# Patient Record
(demographics unavailable — no encounter records)

---

## 2024-10-18 NOTE — ASSESSMENT
[FreeTextEntry1] : # CAD s/p APOLLO-mLAD 6/11/21 (mRCA 50%) - stable - still smoking (5-6 cigarettes/day), ed done - cont ASA, plavix, atorva - ed done re heart healthy lifestyle modifications and diet - 5/2022 TTE wnl, Grade I diastolic dysfunction - 8/2024 TTE c/w nl LV size and sys fxn, EF 64%, basal inferior and basal inferoseptal segments abnormal, Asc Ao 3.4 cm - 9/2024 CCTA c/w mod stenosis in pLAD, patent mLAD stent - check TTE in 6 months   # HTN - BP today 126/86 - improving - cont losartan 100 mg qd - cont amlodipine to 10 mg qd - hctz d/cd 2/2 hyponatremia - 8/2024 TTE c/w nl LV size and sys fxn, EF 64%, basal inferior and basal inferoseptal segments abnormal, Asc Ao 3.4 cm - labs reviewed in detail - check TTE in 6 months  # Frequent PACs - Holter done 10/2021 c/w ~20% PACSs - results discussed in detail with pt - pt states he is not sx'tic, EF preserved on TTE - 3/10/23 Event Monitor c/w ~11% PACs, reviewed with pt  RTC 3 months

## 2024-10-18 NOTE — HISTORY OF PRESENT ILLNESS
[FreeTextEntry1] : 63M w/ HTN, former heavy smoker, epilepsy, admitted to [facility] for syncope resulting in fall with nasal/mandible fracture, noted to have elevated cardiac enzymes, s/p cath 6/11/21 with APOLLO to 70% mLAD. ECHO mild lvh, no valvular disease. No arrythmias identified. Seen by epilepsy team, depakote increased due to highly epileptical focus in right frontal region on EEG. D/c on 6/13/21 on asa and plavix.   10/18/24 OV: pt returns today for f/u and results of CCTA. Overall feeling well since last seen, still smoking cigarettes. 9/6/24 OV: pt returns today for f/u. Overall feeling well since last OV. Denies any c/o chest pain, palpitations or PURDY. Compliant w/ all medications. No c/o abnormal bleeding.  6/28/24 OV: pt returns today for f/u. States he was recently in the hospital for a foreign body in his eye. Otherwise feeling well, no new cardiac complaints.  11/17/23 OV: pt returns today for f/u, states he was recently at the [facility] ED due to a low platelet count. He is now going to f/u with a hematologist. Otherwise feeling well overall. Still smoking cigarettes, ~10/day. 7/7/23 OV: pt returns today for 3 month f/u. No new complaints. 4/7/23 OV: pt returns today to discuss results of Event Monitor. No new complaints. 3/10/23 OV: pt returns today BP check, reports that he has been taking the losartan-hctz 2/17/23 OV: pt returns today for BP check, BP still high. Reports that he did not  the losartan-hctz from his pharmacy. 1/20/23 OV: no new complaints, BP elevated - has not been taking losartan-HCTZ. Still smoking 5-6 cigs/day.  10/14/22 OV: 3 month f/u, no new complaints.  7/15/22 OV: TTE and lab results. States he is preop for a colonoscopy. ET unchanged, unlimited. States he has stopped smoking since last seen.  4/29/22 OV: still smoking 12/15 cigs/day. Otherwise states ET remains unlimited.  1/28/22 OV: no interval change, states ET unlimited, no chest pain or PURDY. Still smoking. 10/5/21 OV: no further eps of dizziness/fainting. 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 smoking again but not as many, 2-3 cigs/day.  7/6/21 OV: since d/c doing well, no further syncopal episodes. No cp or sob. walking a lot, today walked from [facility] street to this office! No bleeding, compliant with asa/plavix. stopped smoking (1ppd) after this recent hospitalization.   9/6/24 ECG: NSR at 62 bpm, nl axis, RBBB, occasional PVCs 11/17/23 ECG: NSR at 67 bpm, nl axis, RBBB, occasional PACs 7/7/23 ECG: NSR at 65 bpm, nl axis, RBBB, occasional PACs 3/10/23 ECG: NSR at 63 bpm, nl axis, RBBB, freq PACs 1/20/23 ECG: NSR at 62 bpm, nl axis, RBBB, freq PACs 10/14/22 ECG: NSR at 66 bpm, nl axis, RBBB, freq PACs  7/15/22 ECG: NSR at 60 bpm, nl axis RBBB 4/29/22 ECG: sinus bradycardia at 43bpm, nl axis, RBBB  1/28/22 ECG: NSR at 63 bpm, nl axis, RBBB, freq PACs

## 2024-10-18 NOTE — HISTORY OF PRESENT ILLNESS
[FreeTextEntry1] : 63M w/ HTN, former heavy smoker, epilepsy, admitted to [facility] for syncope resulting in fall with nasal/mandible fracture, noted to have elevated cardiac enzymes, s/p cath 6/11/21 with APOLLO to 70% mLAD. ECHO mild lvh, no valvular disease. No arrythmias identified. Seen by epilepsy team, depakote increased due to highly epileptical focus in right frontal region on EEG. D/c on 6/13/21 on asa and plavix.   10/18/24 OV: pt returns today for f/u and results of CCTA. Overall feeling well since last seen, still smoking cigarettes. 9/6/24 OV: pt returns today for f/u. Overall feeling well since last OV. Denies any c/o chest pain, palpitations or PURDY. Compliant w/ all medications. No c/o abnormal bleeding.  6/28/24 OV: pt returns today for f/u. States he was recently in the hospital for a foreign body in his eye. Otherwise feeling well, no new cardiac complaints.  11/17/23 OV: pt returns today for f/u, states he was recently at the [facility] ED due to a low platelet count. He is now going to f/u with a hematologist. Otherwise feeling well overall. Still smoking cigarettes, ~10/day. 7/7/23 OV: pt returns today for 3 month f/u. No new complaints. 4/7/23 OV: pt returns today to discuss results of Event Monitor. No new complaints. 3/10/23 OV: pt returns today BP check, reports that he has been taking the losartan-hctz 2/17/23 OV: pt returns today for BP check, BP still high. Reports that he did not  the losartan-hctz from his pharmacy. 1/20/23 OV: no new complaints, BP elevated - has not been taking losartan-HCTZ. Still smoking 5-6 cigs/day.  10/14/22 OV: 3 month f/u, no new complaints.  7/15/22 OV: TTE and lab results. States he is preop for a colonoscopy. ET unchanged, unlimited. States he has stopped smoking since last seen.  4/29/22 OV: still smoking 12/15 cigs/day. Otherwise states ET remains unlimited.  1/28/22 OV: no interval change, states ET unlimited, no chest pain or PURDY. Still smoking. 10/5/21 OV: no further eps of dizziness/fainting. 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 smoking again but not as many, 2-3 cigs/day.  7/6/21 OV: since d/c doing well, no further syncopal episodes. No cp or sob. walking a lot, today walked from [facility] street to this office! No bleeding, compliant with asa/plavix. stopped smoking (1ppd) after this recent hospitalization.   9/6/24 ECG: NSR at 62 bpm, nl axis, RBBB, occasional PVCs 11/17/23 ECG: NSR at 67 bpm, nl axis, RBBB, occasional PACs 7/7/23 ECG: NSR at 65 bpm, nl axis, RBBB, occasional PACs 3/10/23 ECG: NSR at 63 bpm, nl axis, RBBB, freq PACs 1/20/23 ECG: NSR at 62 bpm, nl axis, RBBB, freq PACs 10/14/22 ECG: NSR at 66 bpm, nl axis, RBBB, freq PACs  7/15/22 ECG: NSR at 60 bpm, nl axis RBBB 4/29/22 ECG: sinus bradycardia at 43bpm, nl axis, RBBB  1/28/22 ECG: NSR at 63 bpm, nl axis, RBBB, freq PACs

## 2024-10-18 NOTE — HISTORY OF PRESENT ILLNESS
[FreeTextEntry1] : 63M w/ HTN, former heavy smoker, epilepsy, admitted to [facility] for syncope resulting in fall with nasal/mandible fracture, noted to have elevated cardiac enzymes, s/p cath 6/11/21 with APOLLO to 70% mLAD. ECHO mild lvh, no valvular disease. No arrythmias identified. Seen by epilepsy team, depakote increased due to highly epileptical focus in right frontal region on EEG. D/c on 6/13/21 on asa and plavix.   10/18/24 OV: pt returns today for f/u and results of CCTA. Overall feeling well since last seen, still smoking cigarettes. 9/6/24 OV: pt returns today for f/u. Overall feeling well since last OV. Denies any c/o chest pain, palpitations or PURDY. Compliant w/ all medications. No c/o abnormal bleeding.  6/28/24 OV: pt returns today for f/u. States he was recently in the hospital for a foreign body in his eye. Otherwise feeling well, no new cardiac complaints.  11/17/23 OV: pt returns today for f/u, states he was recently at the [facility] ED due to a low platelet count. He is now going to f/u with a hematologist. Otherwise feeling well overall. Still smoking cigarettes, ~10/day. 7/7/23 OV: pt returns today for 3 month f/u. No new complaints. 4/7/23 OV: pt returns today to discuss results of Event Monitor. No new complaints. 3/10/23 OV: pt returns today BP check, reports that he has been taking the losartan-hctz 2/17/23 OV: pt returns today for BP check, BP still high. Reports that he did not  the losartan-hctz from his pharmacy. 1/20/23 OV: no new complaints, BP elevated - has not been taking losartan-HCTZ. Still smoking 5-6 cigs/day.  10/14/22 OV: 3 month f/u, no new complaints.  7/15/22 OV: TTE and lab results. States he is preop for a colonoscopy. ET unchanged, unlimited. States he has stopped smoking since last seen.  4/29/22 OV: still smoking 12/15 cigs/day. Otherwise states ET remains unlimited.  1/28/22 OV: no interval change, states ET unlimited, no chest pain or PURDY. Still smoking. 10/5/21 OV: no further eps of dizziness/fainting. 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 smoking again but not as many, 2-3 cigs/day.  7/6/21 OV: since d/c doing well, no further syncopal episodes. No cp or sob. walking a lot, today walked from [facility] street to this office! No bleeding, compliant with asa/plavix. stopped smoking (1ppd) after this recent hospitalization.   9/6/24 ECG: NSR at 62 bpm, nl axis, RBBB, occasional PVCs 11/17/23 ECG: NSR at 67 bpm, nl axis, RBBB, occasional PACs 7/7/23 ECG: NSR at 65 bpm, nl axis, RBBB, occasional PACs 3/10/23 ECG: NSR at 63 bpm, nl axis, RBBB, freq PACs 1/20/23 ECG: NSR at 62 bpm, nl axis, RBBB, freq PACs 10/14/22 ECG: NSR at 66 bpm, nl axis, RBBB, freq PACs  7/15/22 ECG: NSR at 60 bpm, nl axis RBBB 4/29/22 ECG: sinus bradycardia at 43bpm, nl axis, RBBB  1/28/22 ECG: NSR at 63 bpm, nl axis, RBBB, freq PACs

## 2025-01-10 NOTE — HISTORY OF PRESENT ILLNESS
[FreeTextEntry1] : 64M current smoker w/ CAD (s/p APOLLO to mLAD - 6/2021), HTN, HLD, PACs, and epilepsy who presents for cardiac evaluation.  1/10/25 OV: pt returns today for f/u. Overall feeling well, denies any c/o chest pain or shortness of breath. No new complaints. Still smoking 5-6 cigarettes/day.  10/18/24 OV: pt returns today for f/u and results of CCTA. Overall feeling well since last seen, still smoking cigarettes. 9/6/24 OV: pt returns today for f/u. Overall feeling well since last OV. Denies any c/o chest pain, palpitations or PURDY. Compliant w/ all medications. No c/o abnormal bleeding.  6/28/24 OV: pt returns today for f/u. States he was recently in the hospital for a foreign body in his eye. Otherwise feeling well, no new cardiac complaints.  11/17/23 OV: pt returns today for f/u, states he was recently at the [facility] ED due to a low platelet count. He is now going to f/u with a hematologist. Otherwise feeling well overall. Still smoking cigarettes, ~10/day. 7/7/23 OV: pt returns today for 3 month f/u. No new complaints. 4/7/23 OV: pt returns today to discuss results of Event Monitor. No new complaints. 3/10/23 OV: pt returns today BP check, reports that he has been taking the losartan-hctz 2/17/23 OV: pt returns today for BP check, BP still high. Reports that he did not  the losartan-hctz from his pharmacy. 1/20/23 OV: no new complaints, BP elevated - has not been taking losartan-HCTZ. Still smoking 5-6 cigs/day.  10/14/22 OV: 3 month f/u, no new complaints.  7/15/22 OV: TTE and lab results. States he is preop for a colonoscopy. ET unchanged, unlimited. States he has stopped smoking since last seen.  4/29/22 OV: still smoking 12/15 cigs/day. Otherwise states ET remains unlimited.  1/28/22 OV: no interval change, states ET unlimited, no chest pain or PURDY. Still smoking. 10/5/21 OV: no further eps of dizziness/fainting. 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 smoking again but not as many, 2-3 cigs/day.  7/6/21 OV: since d/c doing well, no further syncopal episodes. No cp or sob. walking a lot, today walked from [address] to this office! No bleeding, compliant with asa/plavix. stopped smoking (1ppd) after this recent hospitalization.   1/10/25 ECG: NSR at 63 bpm, nl axis, RBBB, frequent PACs 9/6/24 ECG: NSR at 62 bpm, nl axis, RBBB, occasional PVCs 11/17/23 ECG: NSR at 67 bpm, nl axis, RBBB, occasional PACs 7/7/23 ECG: NSR at 65 bpm, nl axis, RBBB, occasional PACs 3/10/23 ECG: NSR at 63 bpm, nl axis, RBBB, freq PACs 1/20/23 ECG: NSR at 62 bpm, nl axis, RBBB, freq PACs 10/14/22 ECG: NSR at 66 bpm, nl axis, RBBB, freq PACs  7/15/22 ECG: NSR at 60 bpm, nl axis RBBB 4/29/22 ECG: sinus bradycardia at 43bpm, nl axis, RBBB  1/28/22 ECG: NSR at 63 bpm, nl axis, RBBB, freq PACs

## 2025-01-10 NOTE — HISTORY OF PRESENT ILLNESS
[FreeTextEntry1] : 64M current smoker w/ CAD (s/p APOLLO to mLAD - 6/2021), HTN, HLD, PACs, and epilepsy who presents for cardiac evaluation.  1/10/25 OV: pt returns today for f/u. Overall feeling well, denies any c/o chest pain or shortness of breath. No new complaints. Still smoking 5-6 cigarettes/day.  10/18/24 OV: pt returns today for f/u and results of CCTA. Overall feeling well since last seen, still smoking cigarettes. 9/6/24 OV: pt returns today for f/u. Overall feeling well since last OV. Denies any c/o chest pain, palpitations or PURDY. Compliant w/ all medications. No c/o abnormal bleeding.  6/28/24 OV: pt returns today for f/u. States he was recently in the hospital for a foreign body in his eye. Otherwise feeling well, no new cardiac complaints.  11/17/23 OV: pt returns today for f/u, states he was recently at the [facility] ED due to a low platelet count. He is now going to f/u with a hematologist. Otherwise feeling well overall. Still smoking cigarettes, ~10/day. 7/7/23 OV: pt returns today for 3 month f/u. No new complaints. 4/7/23 OV: pt returns today to discuss results of Event Monitor. No new complaints. 3/10/23 OV: pt returns today BP check, reports that he has been taking the losartan-hctz 2/17/23 OV: pt returns today for BP check, BP still high. Reports that he did not  the losartan-hctz from his pharmacy. 1/20/23 OV: no new complaints, BP elevated - has not been taking losartan-HCTZ. Still smoking 5-6 cigs/day.  10/14/22 OV: 3 month f/u, no new complaints.  7/15/22 OV: TTE and lab results. States he is preop for a colonoscopy. ET unchanged, unlimited. States he has stopped smoking since last seen.  4/29/22 OV: still smoking 12/15 cigs/day. Otherwise states ET remains unlimited.  1/28/22 OV: no interval change, states ET unlimited, no chest pain or PURDY. Still smoking. 10/5/21 OV: no further eps of dizziness/fainting. 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 smoking again but not as many, 2-3 cigs/day.  7/6/21 OV: since d/c doing well, no further syncopal episodes. No cp or sob. walking a lot, today walked from [address] to this office! No bleeding, compliant with asa/plavix. stopped smoking (1ppd) after this recent hospitalization.   1/10/25 ECG: NSR at 63 bpm, nl axis, RBBB, frequent PACs 9/6/24 ECG: NSR at 62 bpm, nl axis, RBBB, occasional PVCs 11/17/23 ECG: NSR at 67 bpm, nl axis, RBBB, occasional PACs 7/7/23 ECG: NSR at 65 bpm, nl axis, RBBB, occasional PACs 3/10/23 ECG: NSR at 63 bpm, nl axis, RBBB, freq PACs 1/20/23 ECG: NSR at 62 bpm, nl axis, RBBB, freq PACs 10/14/22 ECG: NSR at 66 bpm, nl axis, RBBB, freq PACs  7/15/22 ECG: NSR at 60 bpm, nl axis RBBB 4/29/22 ECG: sinus bradycardia at 43bpm, nl axis, RBBB  1/28/22 ECG: NSR at 63 bpm, nl axis, RBBB, freq PACs

## 2025-01-10 NOTE — PHYSICAL EXAM
[Well Developed] : well developed [Well Nourished] : well nourished [No Acute Distress] : no acute distress [Normal Conjunctiva] : normal conjunctiva [Normal Venous Pressure] : normal venous pressure [No Carotid Bruit] : no carotid bruit [Normal S1, S2] : normal S1, S2 [No Murmur] : no murmur [No Rub] : no rub [No Gallop] : no gallop [Good Air Entry] : good air entry [No Respiratory Distress] : no respiratory distress  [Soft] : abdomen soft [Non Tender] : non-tender [No Masses/organomegaly] : no masses/organomegaly [Normal Bowel Sounds] : normal bowel sounds [Normal Gait] : normal gait [No Edema] : no edema [No Cyanosis] : no cyanosis [No Clubbing] : no clubbing [No Varicosities] : no varicosities [No Rash] : no rash [No Skin Lesions] : no skin lesions [Moves all extremities] : moves all extremities [No Focal Deficits] : no focal deficits [Normal Speech] : normal speech [Alert and Oriented] : alert and oriented [Normal memory] : normal memory

## 2025-01-10 NOTE — ASSESSMENT
[FreeTextEntry1] : # CAD - s/p APOLLO to mLAD 6/2021 - cont ASA 81 mg qd - cont plavix 75 mg qd - cont atorva 80 mg qd  - ed done re heart healthy lifestyle modifications and diet - smoking cessation discussed in detail - will check abd aorta US at age 65 - 5/2022 TTE nl EF, grade I diastolic dysfxn - 8/2024 TTE c/w nl LV size and sys fxn, EF 64%, basal inferior and basal inferoseptal segments abnormal, Asc Ao 3.4 cm - 9/2024 CCTA c/w mod stenosis in pLAD, patent mLAD stent - check TTE 3/2025  # HTN - BP today 120/74 - stable for now - cont losartan 100 mg qd - cont amlodipine 10 mg qd - previously on hctz, d/cd 2/2 hyponatremia - 8/2024 TTE c/w nl LV size and sys fxn, EF 64%, basal inferior and basal inferoseptal segments abnormal, Asc Ao 3.4 cm - check TTE 3/2025  # HLD - 7/2024 lipid panel c/w LDL 59, HDL 40, ,  - results discussed in detail with pt  - cont atorva 80 mg qd - check labs  # PACs - 10/2021 Event Monitor c/w ~20% PAC burden - pt states he is not sx'tic, EF preserved  - 3/2023 Event Monitor c/w ~11% PACs burden - results discussed in detail with pt  RTC after TTE

## 2025-01-13 NOTE — HISTORY OF PRESENT ILLNESS
[FreeTextEntry1] : 64M current smoker w/ CAD (s/p APOLLO to mLAD - 6/2021), HTN, HLD, PACs, and epilepsy who presents for cardiac evaluation.  1/13/25 TH: pt returns today via TH for results of labs. Feeling well, no new complaints. 1/10/25 OV: pt returns today for f/u. Overall feeling well, denies any c/o chest pain or shortness of breath. No new complaints. Still smoking 5-6 cigarettes/day.  10/18/24 OV: pt returns today for f/u and results of CCTA. Overall feeling well since last seen, still smoking cigarettes. 9/6/24 OV: pt returns today for f/u. Overall feeling well since last OV. Denies any c/o chest pain, palpitations or PURDY. Compliant w/ all medications. No c/o abnormal bleeding.  6/28/24 OV: pt returns today for f/u. States he was recently in the hospital for a foreign body in his eye. Otherwise feeling well, no new cardiac complaints.  11/17/23 OV: pt returns today for f/u, states he was recently at the [facility] ED due to a low platelet count. He is now going to f/u with a hematologist. Otherwise feeling well overall. Still smoking cigarettes, ~10/day. 7/7/23 OV: pt returns today for 3 month f/u. No new complaints. 4/7/23 OV: pt returns today to discuss results of Event Monitor. No new complaints. 3/10/23 OV: pt returns today BP check, reports that he has been taking the losartan-hctz 2/17/23 OV: pt returns today for BP check, BP still high. Reports that he did not  the losartan-hctz from his pharmacy. 1/20/23 OV: no new complaints, BP elevated - has not been taking losartan-HCTZ. Still smoking 5-6 cigs/day.  10/14/22 OV: 3 month f/u, no new complaints.  7/15/22 OV: TTE and lab results. States he is preop for a colonoscopy. ET unchanged, unlimited. States he has stopped smoking since last seen.  4/29/22 OV: still smoking 12/15 cigs/day. Otherwise states ET remains unlimited.  1/28/22 OV: no interval change, states ET unlimited, no chest pain or PURDY. Still smoking. 10/5/21 OV: no further eps of dizziness/fainting. 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 smoking again but not as many, 2-3 cigs/day.  7/6/21 OV: since d/c doing well, no further syncopal episodes. No cp or sob. walking a lot, today walked from [facility] street to this office! No bleeding, compliant with asa/plavix. stopped smoking (1ppd) after this recent hospitalization.   1/10/25 ECG: NSR at 63 bpm, nl axis, RBBB, frequent PACs 9/6/24 ECG: NSR at 62 bpm, nl axis, RBBB, occasional PVCs 11/17/23 ECG: NSR at 67 bpm, nl axis, RBBB, occasional PACs 7/7/23 ECG: NSR at 65 bpm, nl axis, RBBB, occasional PACs 3/10/23 ECG: NSR at 63 bpm, nl axis, RBBB, freq PACs 1/20/23 ECG: NSR at 62 bpm, nl axis, RBBB, freq PACs 10/14/22 ECG: NSR at 66 bpm, nl axis, RBBB, freq PACs  7/15/22 ECG: NSR at 60 bpm, nl axis RBBB 4/29/22 ECG: sinus bradycardia at 43bpm, nl axis, RBBB  1/28/22 ECG: NSR at 63 bpm, nl axis, RBBB, freq PACs

## 2025-01-13 NOTE — REASON FOR VISIT
[Home] : at home, [unfilled] , at the time of the visit. [Medical Office: ([facility])___] : at the medical office located in  [Family Member] : family member [Language Line ] : provided by Language Line   [Time Spent: ____ minutes] : Total time spent using  services: [unfilled] minutes. The patient's primary language is not English thus required  services. [Verbal consent obtained from patient] : the patient, [unfilled] [Interpreters_IDNumber] : [id number] [TWNoteComboBox1] : Polish

## 2025-01-13 NOTE — ASSESSMENT
[FreeTextEntry1] : # CAD - s/p APOLLO to mLAD 6/2021 - cont ASA 81 mg qd - cont plavix 75 mg qd - cont atorva 80 mg qd - ed done re heart healthy lifestyle modifications and diet - smoking cessation discussed in detail - will check abd aorta US at age 65 - 5/2022 TTE nl EF, grade I diastolic dysfxn - 8/2024 TTE c/w nl LV size and sys fxn, EF 64%, basal inferior and basal inferoseptal segments abnormal, Asc Ao 3.4 cm - 9/2024 CCTA c/w mod stenosis in pLAD, patent mLAD stent - check TTE 3/2025  # HTN - stable for now - cont losartan 100 mg qd - cont amlodipine 10 mg qd - previously on hctz, d/cd 2/2 hyponatremia - 8/2024 TTE c/w nl LV size and sys fxn, EF 64%, basal inferior and basal inferoseptal segments abnormal, Asc Ao 3.4 cm - check TTE 3/2025  # HLD - 7/2024 lipid panel c/w LDL 59, HDL 40, ,  - cont atorva 80 mg qd - 1/2025 lipid panel c/w LDL 81, HDL 51, , TG 82 - results discussed in detail with pt  - ed done re med compliance and dietary modifications   # PACs - 10/2021 Event Monitor c/w ~20% PAC burden - pt states he is not sx'tic, EF preserved - 3/2023 Event Monitor c/w ~11% PACs burden - results discussed in detail with pt  RTC 5/2025   Spent 11 minutes on telehealth/phone visit, all questions answered in detail.

## 2025-01-13 NOTE — HISTORY OF PRESENT ILLNESS
[FreeTextEntry1] : 64M current smoker w/ CAD (s/p APOLLO to mLAD - 6/2021), HTN, HLD, PACs, and epilepsy who presents for cardiac evaluation.  1/13/25 TH: pt returns today via TH for results of labs. Feeling well, no new complaints. 1/10/25 OV: pt returns today for f/u. Overall feeling well, denies any c/o chest pain or shortness of breath. No new complaints. Still smoking 5-6 cigarettes/day.  10/18/24 OV: pt returns today for f/u and results of CCTA. Overall feeling well since last seen, still smoking cigarettes. 9/6/24 OV: pt returns today for f/u. Overall feeling well since last OV. Denies any c/o chest pain, palpitations or PURDY. Compliant w/ all medications. No c/o abnormal bleeding.  6/28/24 OV: pt returns today for f/u. States he was recently in the hospital for a foreign body in his eye. Otherwise feeling well, no new cardiac complaints.  11/17/23 OV: pt returns today for f/u, states he was recently at the [facility] ED due to a low platelet count. He is now going to f/u with a hematologist. Otherwise feeling well overall. Still smoking cigarettes, ~10/day. 7/7/23 OV: pt returns today for 3 month f/u. No new complaints. 4/7/23 OV: pt returns today to discuss results of Event Monitor. No new complaints. 3/10/23 OV: pt returns today BP check, reports that he has been taking the losartan-hctz 2/17/23 OV: pt returns today for BP check, BP still high. Reports that he did not  the losartan-hctz from his pharmacy. 1/20/23 OV: no new complaints, BP elevated - has not been taking losartan-HCTZ. Still smoking 5-6 cigs/day.  10/14/22 OV: 3 month f/u, no new complaints.  7/15/22 OV: TTE and lab results. States he is preop for a colonoscopy. ET unchanged, unlimited. States he has stopped smoking since last seen.  4/29/22 OV: still smoking 12/15 cigs/day. Otherwise states ET remains unlimited.  1/28/22 OV: no interval change, states ET unlimited, no chest pain or PURDY. Still smoking. 10/5/21 OV: no further eps of dizziness/fainting. 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 smoking again but not as many, 2-3 cigs/day.  7/6/21 OV: since d/c doing well, no further syncopal episodes. No cp or sob. walking a lot, today walked from [facility] [address] to this office! No bleeding, compliant with asa/plavix. stopped smoking (1ppd) after this recent hospitalization.   1/10/25 ECG: NSR at 63 bpm, nl axis, RBBB, frequent PACs 9/6/24 ECG: NSR at 62 bpm, nl axis, RBBB, occasional PVCs 11/17/23 ECG: NSR at 67 bpm, nl axis, RBBB, occasional PACs 7/7/23 ECG: NSR at 65 bpm, nl axis, RBBB, occasional PACs 3/10/23 ECG: NSR at 63 bpm, nl axis, RBBB, freq PACs 1/20/23 ECG: NSR at 62 bpm, nl axis, RBBB, freq PACs 10/14/22 ECG: NSR at 66 bpm, nl axis, RBBB, freq PACs  7/15/22 ECG: NSR at 60 bpm, nl axis RBBB 4/29/22 ECG: sinus bradycardia at 43bpm, nl axis, RBBB  1/28/22 ECG: NSR at 63 bpm, nl axis, RBBB, freq PACs

## 2025-01-13 NOTE — REASON FOR VISIT
[Home] : at home, [unfilled] , at the time of the visit. [Medical Office: ([facility])___] : at the medical office located in  [Family Member] : family member [Language Line ] : provided by Language Line   [Time Spent: ____ minutes] : Total time spent using  services: [unfilled] minutes. The patient's primary language is not English thus required  services. [Verbal consent obtained from patient] : the patient, [unfilled] [Interpreters_IDNumber] : [id number] [TWNoteComboBox1] : Polish

## 2025-05-14 NOTE — ASSESSMENT
[FreeTextEntry1] : # CAD - s/p APOLLO to mLAD 6/2021 - cont ASA 81 mg qd - cont plavix 75 mg qd - cont atorva 80 mg qd - ed done re heart healthy lifestyle modifications and diet - smoking cessation discussed in detail - will check abd aorta US at age 65 - 5/2022 TTE nl EF, grade I diastolic dysfxn - 8/2024 TTE c/w nl LV size and sys fxn, EF 64%, basal inferior and basal inferoseptal segments abnormal, Asc Ao 3.4 cm - 9/2024 CCTA c/w mod stenosis in pLAD, patent mLAD stent - 4/2025 TTE c/w nl LV size and sys fxn, EF 55%, HK of basal inferoseptal wall. - results discussed in detail with pt   # HTN - BP today 120/74 - stable for now - cont losartan 100 mg qd - cont amlodipine 10 mg qd - previously on hctz, d/cd 2/2 hyponatremia - 8/2024 TTE c/w nl LV size and sys fxn, EF 64%, basal inferior and basal inferoseptal segments abnormal, Asc Ao 3.4 cm - 4/2025 TTE c/w nl LV size and sys fxn, EF 55%, HK of basal inferoseptal wall. - results discussed in detail with pt   # HLD - 7/2024 lipid panel c/w LDL 59, HDL 40, ,  - cont atorva 80 mg qd - 1/2025 lipid panel c/w LDL 81, HDL 51, , TG 82 - results discussed in detail with pt - ed done re med compliance and dietary modifications - check labs  # PACs - 10/2021 Event Monitor c/w ~20% PAC burden - pt states he is not sx'tic, EF preserved - 3/2023 Event Monitor c/w ~11% PACs burden - results discussed in detail with pt  RTC after

## 2025-05-14 NOTE — HISTORY OF PRESENT ILLNESS
[FreeTextEntry1] : 64M current smoker w/ CAD (s/p APOLLO to mLAD - 6/2021), HTN, HLD, PACs, and epilepsy who presents for cardiac evaluation.  5/9/25 OV: pt returns today for f/u. Overall feeling well since last seen, no new complaints. Still smoking 3-5 cigarettes per day.  1/13/25 TH: pt returns today via TH for results of labs. Feeling well, no new complaints. 1/10/25 OV: pt returns today for f/u. Overall feeling well, denies any c/o chest pain or shortness of breath. No new complaints. Still smoking 5-6 cigarettes/day.  10/18/24 OV: pt returns today for f/u and results of CCTA. Overall feeling well since last seen, still smoking cigarettes. 9/6/24 OV: pt returns today for f/u. Overall feeling well since last OV. Denies any c/o chest pain, palpitations or PURDY. Compliant w/ all medications. No c/o abnormal bleeding.  6/28/24 OV: pt returns today for f/u. States he was recently in the hospital for a foreign body in his eye. Otherwise feeling well, no new cardiac complaints.  11/17/23 OV: pt returns today for f/u, states he was recently at the [facility] ED due to a low platelet count. He is now going to f/u with a hematologist. Otherwise feeling well overall. Still smoking cigarettes, ~10/day. 7/7/23 OV: pt returns today for 3 month f/u. No new complaints. 4/7/23 OV: pt returns today to discuss results of Event Monitor. No new complaints. 3/10/23 OV: pt returns today BP check, reports that he has been taking the losartan-hctz 2/17/23 OV: pt returns today for BP check, BP still high. Reports that he did not  the losartan-hctz from his pharmacy. 1/20/23 OV: no new complaints, BP elevated - has not been taking losartan-HCTZ. Still smoking 5-6 cigs/day.  10/14/22 OV: 3 month f/u, no new complaints.  7/15/22 OV: TTE and lab results. States he is preop for a colonoscopy. ET unchanged, unlimited. States he has stopped smoking since last seen.  4/29/22 OV: still smoking 12/15 cigs/day. Otherwise states ET remains unlimited.  1/28/22 OV: no interval change, states ET unlimited, no chest pain or PURDY. Still smoking. 10/5/21 OV: no further eps of dizziness/fainting. 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 smoking again but not as many, 2-3 cigs/day.  7/6/21 OV: since d/c doing well, no further syncopal episodes. No cp or sob. walking a lot, today walked from [address] [place] [address] to this office! No bleeding, compliant with asa/plavix. stopped smoking (1ppd) after this recent hospitalization.   5/9/25 ECG: NSR at 61 bpm, nl axis, RBBB, frequent PACs 1/10/25 ECG: NSR at 63 bpm, nl axis, RBBB, frequent PACs 9/6/24 ECG: NSR at 62 bpm, nl axis, RBBB, occasional PVCs 11/17/23 ECG: NSR at 67 bpm, nl axis, RBBB, occasional PACs 7/7/23 ECG: NSR at 65 bpm, nl axis, RBBB, occasional PACs 3/10/23 ECG: NSR at 63 bpm, nl axis, RBBB, freq PACs 1/20/23 ECG: NSR at 62 bpm, nl axis, RBBB, freq PACs 10/14/22 ECG: NSR at 66 bpm, nl axis, RBBB, freq PACs  7/15/22 ECG: NSR at 60 bpm, nl axis RBBB 4/29/22 ECG: sinus bradycardia at 43bpm, nl axis, RBBB  1/28/22 ECG: NSR at 63 bpm, nl axis, RBBB, freq PACs

## 2025-05-14 NOTE — HISTORY OF PRESENT ILLNESS
[FreeTextEntry1] : 64M current smoker w/ CAD (s/p APOLLO to mLAD - 6/2021), HTN, HLD, PACs, and epilepsy who presents for cardiac evaluation.  5/9/25 OV: pt returns today for f/u. Overall feeling well since last seen, no new complaints. Still smoking 3-5 cigarettes per day.  1/13/25 TH: pt returns today via TH for results of labs. Feeling well, no new complaints. 1/10/25 OV: pt returns today for f/u. Overall feeling well, denies any c/o chest pain or shortness of breath. No new complaints. Still smoking 5-6 cigarettes/day.  10/18/24 OV: pt returns today for f/u and results of CCTA. Overall feeling well since last seen, still smoking cigarettes. 9/6/24 OV: pt returns today for f/u. Overall feeling well since last OV. Denies any c/o chest pain, palpitations or PURDY. Compliant w/ all medications. No c/o abnormal bleeding.  6/28/24 OV: pt returns today for f/u. States he was recently in the hospital for a foreign body in his eye. Otherwise feeling well, no new cardiac complaints.  11/17/23 OV: pt returns today for f/u, states he was recently at the [facility] ED due to a low platelet count. He is now going to f/u with a hematologist. Otherwise feeling well overall. Still smoking cigarettes, ~10/day. 7/7/23 OV: pt returns today for 3 month f/u. No new complaints. 4/7/23 OV: pt returns today to discuss results of Event Monitor. No new complaints. 3/10/23 OV: pt returns today BP check, reports that he has been taking the losartan-hctz 2/17/23 OV: pt returns today for BP check, BP still high. Reports that he did not  the losartan-hctz from his pharmacy. 1/20/23 OV: no new complaints, BP elevated - has not been taking losartan-HCTZ. Still smoking 5-6 cigs/day.  10/14/22 OV: 3 month f/u, no new complaints.  7/15/22 OV: TTE and lab results. States he is preop for a colonoscopy. ET unchanged, unlimited. States he has stopped smoking since last seen.  4/29/22 OV: still smoking 12/15 cigs/day. Otherwise states ET remains unlimited.  1/28/22 OV: no interval change, states ET unlimited, no chest pain or PURDY. Still smoking. 10/5/21 OV: no further eps of dizziness/fainting. 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 smoking again but not as many, 2-3 cigs/day.  7/6/21 OV: since d/c doing well, no further syncopal episodes. No cp or sob. walking a lot, today walked from [address] [place] [address] to this office! No bleeding, compliant with asa/plavix. stopped smoking (1ppd) after this recent hospitalization.   5/9/25 ECG: NSR at 61 bpm, nl axis, RBBB, frequent PACs 1/10/25 ECG: NSR at 63 bpm, nl axis, RBBB, frequent PACs 9/6/24 ECG: NSR at 62 bpm, nl axis, RBBB, occasional PVCs 11/17/23 ECG: NSR at 67 bpm, nl axis, RBBB, occasional PACs 7/7/23 ECG: NSR at 65 bpm, nl axis, RBBB, occasional PACs 3/10/23 ECG: NSR at 63 bpm, nl axis, RBBB, freq PACs 1/20/23 ECG: NSR at 62 bpm, nl axis, RBBB, freq PACs 10/14/22 ECG: NSR at 66 bpm, nl axis, RBBB, freq PACs  7/15/22 ECG: NSR at 60 bpm, nl axis RBBB 4/29/22 ECG: sinus bradycardia at 43bpm, nl axis, RBBB  1/28/22 ECG: NSR at 63 bpm, nl axis, RBBB, freq PACs

## 2025-05-14 NOTE — HISTORY OF PRESENT ILLNESS
[FreeTextEntry1] : 64M current smoker w/ CAD (s/p APOLLO to mLAD - 6/2021), HTN, HLD, PACs, and epilepsy who presents for cardiac evaluation.  5/9/25 OV: pt returns today for f/u. Overall feeling well since last seen, no new complaints. Still smoking 3-5 cigarettes per day.  1/13/25 TH: pt returns today via TH for results of labs. Feeling well, no new complaints. 1/10/25 OV: pt returns today for f/u. Overall feeling well, denies any c/o chest pain or shortness of breath. No new complaints. Still smoking 5-6 cigarettes/day.  10/18/24 OV: pt returns today for f/u and results of CCTA. Overall feeling well since last seen, still smoking cigarettes. 9/6/24 OV: pt returns today for f/u. Overall feeling well since last OV. Denies any c/o chest pain, palpitations or PURDY. Compliant w/ all medications. No c/o abnormal bleeding.  6/28/24 OV: pt returns today for f/u. States he was recently in the hospital for a foreign body in his eye. Otherwise feeling well, no new cardiac complaints.  11/17/23 OV: pt returns today for f/u, states he was recently at the [facility] ED due to a low platelet count. He is now going to f/u with a hematologist. Otherwise feeling well overall. Still smoking cigarettes, ~10/day. 7/7/23 OV: pt returns today for 3 month f/u. No new complaints. 4/7/23 OV: pt returns today to discuss results of Event Monitor. No new complaints. 3/10/23 OV: pt returns today BP check, reports that he has been taking the losartan-hctz 2/17/23 OV: pt returns today for BP check, BP still high. Reports that he did not  the losartan-hctz from his pharmacy. 1/20/23 OV: no new complaints, BP elevated - has not been taking losartan-HCTZ. Still smoking 5-6 cigs/day.  10/14/22 OV: 3 month f/u, no new complaints.  7/15/22 OV: TTE and lab results. States he is preop for a colonoscopy. ET unchanged, unlimited. States he has stopped smoking since last seen.  4/29/22 OV: still smoking 12/15 cigs/day. Otherwise states ET remains unlimited.  1/28/22 OV: no interval change, states ET unlimited, no chest pain or PURDY. Still smoking. 10/5/21 OV: no further eps of dizziness/fainting. 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 smoking again but not as many, 2-3 cigs/day.  7/6/21 OV: since d/c doing well, no further syncopal episodes. No cp or sob. walking a lot, today walked from [address] [place] [address] to this office! No bleeding, compliant with asa/plavix. stopped smoking (1ppd) after this recent hospitalization.   5/9/25 ECG: NSR at 61 bpm, nl axis, RBBB, frequent PACs 1/10/25 ECG: NSR at 63 bpm, nl axis, RBBB, frequent PACs 9/6/24 ECG: NSR at 62 bpm, nl axis, RBBB, occasional PVCs 11/17/23 ECG: NSR at 67 bpm, nl axis, RBBB, occasional PACs 7/7/23 ECG: NSR at 65 bpm, nl axis, RBBB, occasional PACs 3/10/23 ECG: NSR at 63 bpm, nl axis, RBBB, freq PACs 1/20/23 ECG: NSR at 62 bpm, nl axis, RBBB, freq PACs 10/14/22 ECG: NSR at 66 bpm, nl axis, RBBB, freq PACs  7/15/22 ECG: NSR at 60 bpm, nl axis RBBB 4/29/22 ECG: sinus bradycardia at 43bpm, nl axis, RBBB  1/28/22 ECG: NSR at 63 bpm, nl axis, RBBB, freq PACs